# Patient Record
Sex: MALE | Race: BLACK OR AFRICAN AMERICAN | NOT HISPANIC OR LATINO | URBAN - METROPOLITAN AREA
[De-identification: names, ages, dates, MRNs, and addresses within clinical notes are randomized per-mention and may not be internally consistent; named-entity substitution may affect disease eponyms.]

---

## 2018-06-01 ENCOUNTER — OUTPATIENT (OUTPATIENT)
Dept: OUTPATIENT SERVICES | Facility: HOSPITAL | Age: 48
LOS: 1 days | End: 2018-06-01

## 2018-06-03 ENCOUNTER — INPATIENT (INPATIENT)
Facility: HOSPITAL | Age: 48
LOS: 2 days | Discharge: HOME | DRG: 305 | End: 2018-06-06
Attending: INTERNAL MEDICINE | Admitting: INTERNAL MEDICINE
Payer: MEDICAID

## 2018-06-03 VITALS
DIASTOLIC BLOOD PRESSURE: 118 MMHG | SYSTOLIC BLOOD PRESSURE: 159 MMHG | RESPIRATION RATE: 18 BRPM | TEMPERATURE: 98 F | HEART RATE: 70 BPM | OXYGEN SATURATION: 99 %

## 2018-06-03 DIAGNOSIS — R74.8 ABNORMAL LEVELS OF OTHER SERUM ENZYMES: ICD-10-CM

## 2018-06-03 LAB
ALBUMIN SERPL ELPH-MCNC: 4 G/DL — SIGNIFICANT CHANGE UP (ref 3.5–5)
ALP SERPL-CCNC: 75 U/L — SIGNIFICANT CHANGE UP (ref 40–120)
ALT FLD-CCNC: 36 U/L DA — SIGNIFICANT CHANGE UP (ref 10–60)
ANION GAP SERPL CALC-SCNC: 6 MMOL/L — SIGNIFICANT CHANGE UP (ref 5–17)
APPEARANCE UR: CLEAR — SIGNIFICANT CHANGE UP
AST SERPL-CCNC: 28 U/L — SIGNIFICANT CHANGE UP (ref 10–40)
BASOPHILS # BLD AUTO: 0.1 K/UL — SIGNIFICANT CHANGE UP (ref 0–0.2)
BASOPHILS NFR BLD AUTO: 2.5 % — HIGH (ref 0–2)
BILIRUB SERPL-MCNC: 0.8 MG/DL — SIGNIFICANT CHANGE UP (ref 0.2–1.2)
BILIRUB UR-MCNC: NEGATIVE — SIGNIFICANT CHANGE UP
BUN SERPL-MCNC: 13 MG/DL — SIGNIFICANT CHANGE UP (ref 7–18)
CALCIUM SERPL-MCNC: 8.9 MG/DL — SIGNIFICANT CHANGE UP (ref 8.4–10.5)
CHLORIDE SERPL-SCNC: 106 MMOL/L — SIGNIFICANT CHANGE UP (ref 96–108)
CK MB BLD-MCNC: 0.3 % — SIGNIFICANT CHANGE UP (ref 0–3.5)
CK MB CFR SERPL CALC: 1.4 NG/ML — SIGNIFICANT CHANGE UP (ref 0–3.6)
CK SERPL-CCNC: 547 U/L — HIGH (ref 35–232)
CO2 SERPL-SCNC: 28 MMOL/L — SIGNIFICANT CHANGE UP (ref 22–31)
COLOR SPEC: YELLOW — SIGNIFICANT CHANGE UP
CREAT SERPL-MCNC: 1.13 MG/DL — SIGNIFICANT CHANGE UP (ref 0.5–1.3)
DIFF PNL FLD: ABNORMAL
EOSINOPHIL # BLD AUTO: 0.1 K/UL — SIGNIFICANT CHANGE UP (ref 0–0.5)
EOSINOPHIL NFR BLD AUTO: 3.4 % — SIGNIFICANT CHANGE UP (ref 0–6)
GLUCOSE SERPL-MCNC: 88 MG/DL — SIGNIFICANT CHANGE UP (ref 70–99)
GLUCOSE UR QL: NEGATIVE — SIGNIFICANT CHANGE UP
HCT VFR BLD CALC: 49.5 % — SIGNIFICANT CHANGE UP (ref 39–50)
HGB BLD-MCNC: 15.8 G/DL — SIGNIFICANT CHANGE UP (ref 13–17)
KETONES UR-MCNC: NEGATIVE — SIGNIFICANT CHANGE UP
LEUKOCYTE ESTERASE UR-ACNC: NEGATIVE — SIGNIFICANT CHANGE UP
LYMPHOCYTES # BLD AUTO: 2 K/UL — SIGNIFICANT CHANGE UP (ref 1–3.3)
LYMPHOCYTES # BLD AUTO: 53.2 % — HIGH (ref 13–44)
MCHC RBC-ENTMCNC: 27.4 PG — SIGNIFICANT CHANGE UP (ref 27–34)
MCHC RBC-ENTMCNC: 32 GM/DL — SIGNIFICANT CHANGE UP (ref 32–36)
MCV RBC AUTO: 85.6 FL — SIGNIFICANT CHANGE UP (ref 80–100)
MONOCYTES # BLD AUTO: 0.2 K/UL — SIGNIFICANT CHANGE UP (ref 0–0.9)
MONOCYTES NFR BLD AUTO: 6.3 % — SIGNIFICANT CHANGE UP (ref 2–14)
NEUTROPHILS # BLD AUTO: 1.3 K/UL — LOW (ref 1.8–7.4)
NEUTROPHILS NFR BLD AUTO: 34.6 % — LOW (ref 43–77)
NITRITE UR-MCNC: NEGATIVE — SIGNIFICANT CHANGE UP
PH UR: 6 — SIGNIFICANT CHANGE UP (ref 5–8)
PLATELET # BLD AUTO: 226 K/UL — SIGNIFICANT CHANGE UP (ref 150–400)
POTASSIUM SERPL-MCNC: 4.1 MMOL/L — SIGNIFICANT CHANGE UP (ref 3.5–5.3)
POTASSIUM SERPL-SCNC: 4.1 MMOL/L — SIGNIFICANT CHANGE UP (ref 3.5–5.3)
PROT SERPL-MCNC: 7.8 G/DL — SIGNIFICANT CHANGE UP (ref 6–8.3)
PROT UR-MCNC: NEGATIVE — SIGNIFICANT CHANGE UP
RBC # BLD: 5.78 M/UL — SIGNIFICANT CHANGE UP (ref 4.2–5.8)
RBC # FLD: 13.6 % — SIGNIFICANT CHANGE UP (ref 10.3–14.5)
SODIUM SERPL-SCNC: 140 MMOL/L — SIGNIFICANT CHANGE UP (ref 135–145)
SP GR SPEC: 1.01 — SIGNIFICANT CHANGE UP (ref 1.01–1.02)
TROPONIN I SERPL-MCNC: <0.015 NG/ML — SIGNIFICANT CHANGE UP (ref 0–0.04)
UROBILINOGEN FLD QL: NEGATIVE — SIGNIFICANT CHANGE UP
WBC # BLD: 3.7 K/UL — LOW (ref 3.8–10.5)
WBC # FLD AUTO: 3.7 K/UL — LOW (ref 3.8–10.5)

## 2018-06-03 PROCEDURE — 74176 CT ABD & PELVIS W/O CONTRAST: CPT | Mod: 26

## 2018-06-03 PROCEDURE — 99285 EMERGENCY DEPT VISIT HI MDM: CPT

## 2018-06-03 RX ORDER — SODIUM CHLORIDE 9 MG/ML
1000 INJECTION INTRAMUSCULAR; INTRAVENOUS; SUBCUTANEOUS
Qty: 0 | Refills: 0 | Status: DISCONTINUED | OUTPATIENT
Start: 2018-06-03 | End: 2018-06-03

## 2018-06-03 RX ORDER — SODIUM CHLORIDE 9 MG/ML
1000 INJECTION INTRAMUSCULAR; INTRAVENOUS; SUBCUTANEOUS ONCE
Qty: 0 | Refills: 0 | Status: COMPLETED | OUTPATIENT
Start: 2018-06-03 | End: 2018-06-03

## 2018-06-03 RX ORDER — ACETAMINOPHEN 500 MG
650 TABLET ORAL ONCE
Qty: 0 | Refills: 0 | Status: COMPLETED | OUTPATIENT
Start: 2018-06-03 | End: 2018-06-03

## 2018-06-03 RX ORDER — SODIUM CHLORIDE 9 MG/ML
1000 INJECTION, SOLUTION INTRAVENOUS
Qty: 0 | Refills: 0 | Status: DISCONTINUED | OUTPATIENT
Start: 2018-06-03 | End: 2018-06-06

## 2018-06-03 NOTE — ED ADULT NURSE NOTE - OBJECTIVE STATEMENT
PAtient presents to ED c/o left flank pain for year state" hes been to his doctor they say he has a knot he" been to therapy and it doesn't help. So patient came to ER

## 2018-06-03 NOTE — ED PROVIDER NOTE - PROGRESS NOTE DETAILS
results discussed with PMD and patient, Dr. Vaughan would like patient admitted given elevated CK and hematuria and elevated BP.  Patient agrees.

## 2018-06-03 NOTE — ED PROVIDER NOTE - MEDICAL DECISION MAKING DETAILS
47 y/o M pt with likely MSK given duration of sx. However, given PMD concerns, will check CT, labs, and UA. If results unremarkable will discuss blood pressure regimen with PMD.

## 2018-06-03 NOTE — ED ADULT NURSE NOTE - CHPI ED SYMPTOMS NEG
no vomiting/no nausea/no decreased eating/drinking/no dizziness/no fever/no chills/no tingling/no weakness/no numbness

## 2018-06-03 NOTE — ED PROVIDER NOTE - OBJECTIVE STATEMENT
47 y/o M pt with a PMHx of HTN (on Exforge) and no significant PSHx presents to the ED c/o L flank pain and HTN. Pt reports that he was sent from Dr. Vaughan's office for his sx to r/u possible kidney stones/AAA; states he has had lower left back pain for a while. Pt denies leg pain, nausea, vomiting, abdominal pain or any other complaints. NKDA.

## 2018-06-03 NOTE — ED ADULT TRIAGE NOTE - CHIEF COMPLAINT QUOTE
as per pt " I have the high blood pressure back pain to the left side " sent by pmd for evaluation for high blood pressure

## 2018-06-04 DIAGNOSIS — R74.8 ABNORMAL LEVELS OF OTHER SERUM ENZYMES: ICD-10-CM

## 2018-06-04 DIAGNOSIS — I10 ESSENTIAL (PRIMARY) HYPERTENSION: ICD-10-CM

## 2018-06-04 DIAGNOSIS — R10.9 UNSPECIFIED ABDOMINAL PAIN: ICD-10-CM

## 2018-06-04 DIAGNOSIS — Z29.9 ENCOUNTER FOR PROPHYLACTIC MEASURES, UNSPECIFIED: ICD-10-CM

## 2018-06-04 LAB
ANION GAP SERPL CALC-SCNC: 8 MMOL/L — SIGNIFICANT CHANGE UP (ref 5–17)
BUN SERPL-MCNC: 10 MG/DL — SIGNIFICANT CHANGE UP (ref 7–18)
CALCIUM SERPL-MCNC: 8.9 MG/DL — SIGNIFICANT CHANGE UP (ref 8.4–10.5)
CHLORIDE SERPL-SCNC: 106 MMOL/L — SIGNIFICANT CHANGE UP (ref 96–108)
CHOLEST SERPL-MCNC: 154 MG/DL — SIGNIFICANT CHANGE UP (ref 10–199)
CK SERPL-CCNC: 520 U/L — HIGH (ref 35–232)
CO2 SERPL-SCNC: 26 MMOL/L — SIGNIFICANT CHANGE UP (ref 22–31)
CREAT SERPL-MCNC: 1.03 MG/DL — SIGNIFICANT CHANGE UP (ref 0.5–1.3)
FOLATE SERPL-MCNC: 7.3 NG/ML — SIGNIFICANT CHANGE UP
GLUCOSE SERPL-MCNC: 76 MG/DL — SIGNIFICANT CHANGE UP (ref 70–99)
HBA1C BLD-MCNC: 5.6 % — SIGNIFICANT CHANGE UP (ref 4–5.6)
HCT VFR BLD CALC: 48.7 % — SIGNIFICANT CHANGE UP (ref 39–50)
HDLC SERPL-MCNC: 39 MG/DL — LOW (ref 40–125)
HGB BLD-MCNC: 15.4 G/DL — SIGNIFICANT CHANGE UP (ref 13–17)
LIPID PNL WITH DIRECT LDL SERPL: 96 MG/DL — SIGNIFICANT CHANGE UP
MAGNESIUM SERPL-MCNC: 2.1 MG/DL — SIGNIFICANT CHANGE UP (ref 1.6–2.6)
MCHC RBC-ENTMCNC: 27.3 PG — SIGNIFICANT CHANGE UP (ref 27–34)
MCHC RBC-ENTMCNC: 31.6 GM/DL — LOW (ref 32–36)
MCV RBC AUTO: 86.2 FL — SIGNIFICANT CHANGE UP (ref 80–100)
PHOSPHATE SERPL-MCNC: 3 MG/DL — SIGNIFICANT CHANGE UP (ref 2.5–4.5)
PLATELET # BLD AUTO: 227 K/UL — SIGNIFICANT CHANGE UP (ref 150–400)
POTASSIUM SERPL-MCNC: 3.7 MMOL/L — SIGNIFICANT CHANGE UP (ref 3.5–5.3)
POTASSIUM SERPL-SCNC: 3.7 MMOL/L — SIGNIFICANT CHANGE UP (ref 3.5–5.3)
RBC # BLD: 5.65 M/UL — SIGNIFICANT CHANGE UP (ref 4.2–5.8)
RBC # FLD: 13.8 % — SIGNIFICANT CHANGE UP (ref 10.3–14.5)
SODIUM SERPL-SCNC: 140 MMOL/L — SIGNIFICANT CHANGE UP (ref 135–145)
TOTAL CHOLESTEROL/HDL RATIO MEASUREMENT: 3.9 RATIO — SIGNIFICANT CHANGE UP (ref 3.4–9.6)
TRIGL SERPL-MCNC: 95 MG/DL — SIGNIFICANT CHANGE UP (ref 10–149)
TSH SERPL-MCNC: 1.02 UU/ML — SIGNIFICANT CHANGE UP (ref 0.34–4.82)
VIT B12 SERPL-MCNC: 513 PG/ML — SIGNIFICANT CHANGE UP (ref 232–1245)
WBC # BLD: 4.5 K/UL — SIGNIFICANT CHANGE UP (ref 3.8–10.5)
WBC # FLD AUTO: 4.5 K/UL — SIGNIFICANT CHANGE UP (ref 3.8–10.5)

## 2018-06-04 RX ORDER — NIFEDIPINE 30 MG
30 TABLET, EXTENDED RELEASE 24 HR ORAL ONCE
Qty: 0 | Refills: 0 | Status: COMPLETED | OUTPATIENT
Start: 2018-06-04 | End: 2018-06-04

## 2018-06-04 RX ORDER — LOSARTAN POTASSIUM 100 MG/1
100 TABLET, FILM COATED ORAL DAILY
Qty: 0 | Refills: 0 | Status: DISCONTINUED | OUTPATIENT
Start: 2018-06-04 | End: 2018-06-06

## 2018-06-04 RX ADMIN — LOSARTAN POTASSIUM 100 MILLIGRAM(S): 100 TABLET, FILM COATED ORAL at 22:02

## 2018-06-04 RX ADMIN — SODIUM CHLORIDE 1000 MILLILITER(S): 9 INJECTION INTRAMUSCULAR; INTRAVENOUS; SUBCUTANEOUS at 02:03

## 2018-06-04 RX ADMIN — Medication 30 MILLIGRAM(S): at 06:52

## 2018-06-04 NOTE — H&P ADULT - HISTORY OF PRESENT ILLNESS
49 y/o M  with a PMHx of HTN  presents to the ED c/o L flank pain and HTN. Pt reports that he was sent from Dr. Vaughan's office for this symptoms and was sent to ED to r/u possible kidney stones vs AAA. Pt states he has had lower left back pain for a while and is not new. At ED abdominal CT was done which came back negative for kidney stones. Chemistry shows elevated CK. Pt does not remember his BP medication   Pt denies leg pain, nausea, vomiting, abdominal pain or any other complaints. NKDA

## 2018-06-04 NOTE — H&P ADULT - ASSESSMENT
49 y/o M  with a PMHx of HTN  presents to the ED c/o L flank pain and HTN. Pt reports that he was sent from Dr. Vaughan's office for this symptoms and was sent to ED to r/u possible kidney stones vs AAA. Pt states he has had lower left back pain for a while and is not new. At ED abdominal CT was done which came back negative for kidney stones. Chemistry shows elevated CK.

## 2018-06-05 LAB
ANION GAP SERPL CALC-SCNC: 7 MMOL/L — SIGNIFICANT CHANGE UP (ref 5–17)
BUN SERPL-MCNC: 8 MG/DL — SIGNIFICANT CHANGE UP (ref 7–18)
CALCIUM SERPL-MCNC: 8.6 MG/DL — SIGNIFICANT CHANGE UP (ref 8.4–10.5)
CHLORIDE SERPL-SCNC: 106 MMOL/L — SIGNIFICANT CHANGE UP (ref 96–108)
CO2 SERPL-SCNC: 29 MMOL/L — SIGNIFICANT CHANGE UP (ref 22–31)
CREAT SERPL-MCNC: 1.01 MG/DL — SIGNIFICANT CHANGE UP (ref 0.5–1.3)
GLUCOSE SERPL-MCNC: 86 MG/DL — SIGNIFICANT CHANGE UP (ref 70–99)
HCT VFR BLD CALC: 49.1 % — SIGNIFICANT CHANGE UP (ref 39–50)
HGB BLD-MCNC: 15.4 G/DL — SIGNIFICANT CHANGE UP (ref 13–17)
MCHC RBC-ENTMCNC: 27.2 PG — SIGNIFICANT CHANGE UP (ref 27–34)
MCHC RBC-ENTMCNC: 31.4 GM/DL — LOW (ref 32–36)
MCV RBC AUTO: 86.5 FL — SIGNIFICANT CHANGE UP (ref 80–100)
PLATELET # BLD AUTO: 225 K/UL — SIGNIFICANT CHANGE UP (ref 150–400)
POTASSIUM SERPL-MCNC: 3.4 MMOL/L — LOW (ref 3.5–5.3)
POTASSIUM SERPL-SCNC: 3.4 MMOL/L — LOW (ref 3.5–5.3)
RBC # BLD: 5.68 M/UL — SIGNIFICANT CHANGE UP (ref 4.2–5.8)
RBC # FLD: 13.5 % — SIGNIFICANT CHANGE UP (ref 10.3–14.5)
SODIUM SERPL-SCNC: 142 MMOL/L — SIGNIFICANT CHANGE UP (ref 135–145)
WBC # BLD: 4.7 K/UL — SIGNIFICANT CHANGE UP (ref 3.8–10.5)
WBC # FLD AUTO: 4.7 K/UL — SIGNIFICANT CHANGE UP (ref 3.8–10.5)

## 2018-06-05 PROCEDURE — 99222 1ST HOSP IP/OBS MODERATE 55: CPT

## 2018-06-05 RX ORDER — NIFEDIPINE 30 MG
60 TABLET, EXTENDED RELEASE 24 HR ORAL DAILY
Qty: 0 | Refills: 0 | Status: DISCONTINUED | OUTPATIENT
Start: 2018-06-05 | End: 2018-06-06

## 2018-06-05 RX ORDER — TRAMADOL HYDROCHLORIDE 50 MG/1
25 TABLET ORAL ONCE
Qty: 0 | Refills: 0 | Status: DISCONTINUED | OUTPATIENT
Start: 2018-06-05 | End: 2018-06-05

## 2018-06-05 RX ADMIN — Medication 60 MILLIGRAM(S): at 16:14

## 2018-06-05 RX ADMIN — TRAMADOL HYDROCHLORIDE 25 MILLIGRAM(S): 50 TABLET ORAL at 21:29

## 2018-06-05 RX ADMIN — SODIUM CHLORIDE 100 MILLILITER(S): 9 INJECTION, SOLUTION INTRAVENOUS at 06:49

## 2018-06-05 RX ADMIN — TRAMADOL HYDROCHLORIDE 25 MILLIGRAM(S): 50 TABLET ORAL at 22:00

## 2018-06-05 NOTE — CONSULT NOTE ADULT - SUBJECTIVE AND OBJECTIVE BOX
General Surgery Consultation Note    Patient is a 48y old  Male who presents with a chief complaint of left flank pain (2018 04:00)      HPI:  47 y/o M  with a PMHx of HTN  presents to the ED c/o L flank pain and HTN.  Pt had CT scan which showed gallstones. Per patient, he only had L flank pain on this admission. Pt states had an episode of epigastric pain once in the past.  Denies any history of yellowing of skin or eyes.  Pt admits to nl urine and stool color.  Pt c/o upper right back mass for 2 years which has grown in size since first noted.  He would like it removed. Pt denies pain, drainage from mass. No other complaints.     PAST MEDICAL & SURGICAL HISTORY:  Hypertension  No significant past surgical history      Allergies    No Known Allergies    MEDICATIONS  (STANDING):  losartan 100 milliGRAM(s) Oral daily  NIFEdipine XL 60 milliGRAM(s) Oral daily  sodium chloride 0.45%. 1000 milliLiter(s) (100 mL/Hr) IV Continuous <Continuous>    MEDICATIONS  (PRN):      Vital Signs Last 24 Hrs  T(C): 36.9 (2018 14:30), Max: 37.2 (2018 20:45)  T(F): 98.5 (2018 14:30), Max: 98.9 (2018 20:45)  HR: 70 (2018 14:30) (68 - 73)  BP: 159/104 (2018 14:30) (137/92 - 159/104)  BP(mean): --  RR: 16 (2018 14:30) (16 - 18)  SpO2: 100% (2018 14:30) (100% - 100%)    Physical Exam:  Gen: awake, alert oriented NAD  HEENT: anicteric  Abd: soft NT ND  Back: L mid back tenderness with palpation. no masses palpated. spine midline. R upper back pedunculated flesh colored mass, mobile, soft, nontender  Ext: warm to touch no c/c/e    Labs:                          15.4   4.7   )-----------( 225      ( 2018 08:55 )             49.1     06-05    142  |  106  |  8   ----------------------------<  86  3.4<L>   |  29  |  1.01    Ca    8.6      2018 08:55  Phos  3.0     06-04  Mg     2.1     06-04    TPro  7.8  /  Alb  4.0  /  TBili  0.8  /  DBili  x   /  AST  28  /  ALT  36  /  AlkPhos  75  06-03      Urinalysis Basic - ( 2018 18:37 )    Color: Yellow / Appearance: Clear / S.015 / pH: x  Gluc: x / Ketone: Negative  / Bili: Negative / Urobili: Negative   Blood: x / Protein: Negative / Nitrite: Negative   Leuk Esterase: Negative / RBC: 2-5 /HPF / WBC 0-2 /HPF   Sq Epi: x / Non Sq Epi: Occasional /HPF / Bacteria: Trace /HPF        Radiological Exams:  < from: CT Abdomen and Pelvis No Cont (18 @ 21:51) >  IMPRESSION:    No nephrolithiasis, hydronephrosis or obstructive uropathy.  Partially contracted gallbladder containing multiple gallstones.  Small umbilical hernia containing fat and portion of nonobstructed small   bowel loops.  Normal appendix.    < end of copied text >

## 2018-06-05 NOTE — PROGRESS NOTE ADULT - PROBLEM SELECTOR PLAN 1
CK: 547  no signs of trauma   hydration   f/u CK am
CK: 547-> 520  no signs of trauma   IV hydration   f/u CK am

## 2018-06-05 NOTE — PROGRESS NOTE ADULT - ASSESSMENT
49 y/o M  with a PMHx of HTN  presents to the ED c/o L flank pain and HTN. Pt reports that he was sent from Dr. Vaughan's office for this symptoms and was sent to ED to r/u possible kidney stones vs AAA. Pt states he has had lower left back pain for a while and is not new. At ED abdominal CT was done which came back negative for kidney stones. Chemistry shows elevated CK.
47 y/o M  with a PMHx of HTN  presents to the ED c/o L flank pain and HTN. Pt reports that he was sent from Dr. Vaughan's office for this symptoms and was sent to ED to r/u possible kidney stones vs AAA. Pt states he has had lower left back pain for a while and is not new. At ED abdominal CT was done which came back negative for kidney stones. Chemistry shows elevated CK.

## 2018-06-05 NOTE — PROGRESS NOTE ADULT - PROBLEM SELECTOR PLAN 2
left flank pain  UA: mild hematuria  passing stone?  IVF  pain control as needed  gi and surgical eval for gallstone
left flank pain  UA: mild hematuria  passing stone?  IVF  pain control as needed  gi and surgical eval for gallstone

## 2018-06-05 NOTE — PROGRESS NOTE ADULT - SUBJECTIVE AND OBJECTIVE BOX
Patient is a 48y old  Male who presents with a chief complaint of left flank pain (2018 04:00)      INTERVAL HPI/OVERNIGHT EVENTS:    T(C): 36.7 (18 @ 05:37), Max: 37.2 (18 @ 20:45)  HR: 71 (18 @ 05:37) (68 - 83)  BP: 146/105 (18 @ 05:37) (137/92 - 151/107)  RR: 18 (18 @ 05:37) (17 - 18)  SpO2: 100% (18 @ 05:37) (100% - 100%)  Wt(kg): --  I&O's Summary      LABS:                        15.4   4.7   )-----------( 225      ( 2018 08:55 )             49.1     06-04    140  |  106  |  10  ----------------------------<  76  3.7   |  26  |  1.03    Ca    8.9      2018 06:46  Phos  3.0     06-04  Mg     2.1     06-04    TPro  7.8  /  Alb  4.0  /  TBili  0.8  /  DBili  x   /  AST  28  /  ALT  36  /  AlkPhos  75  06-03      Urinalysis Basic - ( 2018 18:37 )    Color: Yellow / Appearance: Clear / S.015 / pH: x  Gluc: x / Ketone: Negative  / Bili: Negative / Urobili: Negative   Blood: x / Protein: Negative / Nitrite: Negative   Leuk Esterase: Negative / RBC: 2-5 /HPF / WBC 0-2 /HPF   Sq Epi: x / Non Sq Epi: Occasional /HPF / Bacteria: Trace /HPF      CAPILLARY BLOOD GLUCOSE        LIPID PANEL  Cholesterol 154  LDL 96  HDL 39  RATIO HDL/Total Cholesterol --  Triglyceride 95        Urinalysis Basic - ( 2018 18:37 )    Color: Yellow / Appearance: Clear / S.015 / pH: x  Gluc: x / Ketone: Negative  / Bili: Negative / Urobili: Negative   Blood: x / Protein: Negative / Nitrite: Negative   Leuk Esterase: Negative / RBC: 2-5 /HPF / WBC 0-2 /HPF   Sq Epi: x / Non Sq Epi: Occasional /HPF / Bacteria: Trace /HPF        MEDICATIONS  (STANDING):  losartan 100 milliGRAM(s) Oral daily  sodium chloride 0.45%. 1000 milliLiter(s) (100 mL/Hr) IV Continuous <Continuous>    MEDICATIONS  (PRN):        PHYSICAL EXAM:  GENERAL: NAD  	LUNG: Clear to auscultation bilaterally; No wheeze; No crackles; No accessory muscles used  	CVS: Regular rate and rhythm, no RMG  	ABDOMEN: Soft, Nontender, Nondistended; Bowel sounds present; No guarding  	: no suprapubic pain   	EXTREMITIES:  2+ Peripheral Pulses, No cyanosis or edema  	SKIN: No rashes or lesions  Neuro: AAOx3, non-focal    Care Discussed with Consultants/Other Providers [ x] YES  [ ] NO        Imaging Personally Reviewed:  [x ] YES  [ ] NO    RADIOLOGY & ADDITIONAL TESTS:  < from: CT Abdomen and Pelvis No Cont (18 @ 21:51) >  IMPRESSION:    No nephrolithiasis, hydronephrosis or obstructive uropathy.  Partially contracted gallbladder containing multiple gallstones.  Small umbilical hernia containing fat and portion of nonobstructed small   bowel loops.  Normal appendix.    < end of copied text >
Patient was seen and examined  Patient is a 48y old  Male who presents with a chief complaint of left flank pain (2018 04:00)      INTERVAL HPI/OVERNIGHT EVENTS:  T(C): 36.7 (18 @ 05:37), Max: 37.2 (18 @ 20:45)  HR: 71 (18 @ 05:37) (68 - 83)  BP: 146/105 (18 @ 05:37) (137/92 - 151/107)  RR: 18 (18 @ 05:37) (17 - 18)  SpO2: 100% (18 @ 05:37) (100% - 100%)  Wt(kg): --  I&O's Summary      LABS:                        15.4   4.7   )-----------( 225      ( 2018 08:55 )             49.1     06-04    140  |  106  |  10  ----------------------------<  76  3.7   |  26  |  1.03    Ca    8.9      2018 06:46  Phos  3.0     06-04  Mg     2.1     06-04    TPro  7.8  /  Alb  4.0  /  TBili  0.8  /  DBili  x   /  AST  28  /  ALT  36  /  AlkPhos  75  06-03      Urinalysis Basic - ( 2018 18:37 )    Color: Yellow / Appearance: Clear / S.015 / pH: x  Gluc: x / Ketone: Negative  / Bili: Negative / Urobili: Negative   Blood: x / Protein: Negative / Nitrite: Negative   Leuk Esterase: Negative / RBC: 2-5 /HPF / WBC 0-2 /HPF   Sq Epi: x / Non Sq Epi: Occasional /HPF / Bacteria: Trace /HPF      CAPILLARY BLOOD GLUCOSE        LIPID PANEL  Cholesterol 154  LDL 96  HDL 39  RATIO HDL/Total Cholesterol --  Triglyceride 95        Urinalysis Basic - ( 2018 18:37 )    Color: Yellow / Appearance: Clear / S.015 / pH: x  Gluc: x / Ketone: Negative  / Bili: Negative / Urobili: Negative   Blood: x / Protein: Negative / Nitrite: Negative   Leuk Esterase: Negative / RBC: 2-5 /HPF / WBC 0-2 /HPF   Sq Epi: x / Non Sq Epi: Occasional /HPF / Bacteria: Trace /HPF        MEDICATIONS  (STANDING):  losartan 100 milliGRAM(s) Oral daily  sodium chloride 0.45%. 1000 milliLiter(s) (100 mL/Hr) IV Continuous <Continuous>    MEDICATIONS  (PRN):      RADIOLOGY & ADDITIONAL TESTS:    Imaging Personally Reviewed:  [ ] YES  [ ] NO    REVIEW OF SYSTEMS:  CONSTITUTIONAL: No fever, weight loss, or fatigue  EYES: No eye pain, visual disturbances, or discharge  ENMT:  No difficulty hearing, tinnitus, vertigo; No sinus or throat pain  NECK: No pain or stiffness  BREASTS: No pain, masses, or nipple discharge  RESPIRATORY: No cough, wheezing, chills or hemoptysis; No shortness of breath  CARDIOVASCULAR: No chest pain, palpitations, dizziness, or leg swelling  GASTROINTESTINAL: No abdominal or epigastric pain. No nausea, vomiting, or hematemesis; No diarrhea or constipation. No melena or hematochezia.  GENITOURINARY: No dysuria, frequency, hematuria, or incontinence  NEUROLOGICAL: No headaches, memory loss, loss of strength, numbness, or tremors  SKIN: No itching, burning, rashes, or lesions   LYMPH NODES: No enlarged glands  ENDOCRINE: No heat or cold intolerance; No hair loss  MUSCULOSKELETAL: No joint pain or swelling; No muscle, back, or extremity pain  PSYCHIATRIC: No depression, anxiety, mood swings, or difficulty sleeping  HEME/LYMPH: No easy bruising, or bleeding gums  ALLERY AND IMMUNOLOGIC: No hives or eczema      Consultant(s) Notes Reviewed:  [ ] YES  [ ] NO    PHYSICAL EXAM:  GENERAL: NAD, well-groomed, well-developed  HEAD:  Atraumatic, Normocephalic  EYES: EOMI, PERRLA, conjunctiva and sclera clear  ENMT: No tonsillar erythema, exudates, or enlargement; Moist mucous membranes, Good dentition, No lesions  NECK: Supple, No JVD, Normal thyroid  NERVOUS SYSTEM:  Alert & Oriented X3, Good concentration; Motor Strength 5/5 B/L upper and lower extremities; DTRs 2+ intact and symmetric  CHEST/LUNG: Clear to percussion bilaterally; No rales, rhonchi, wheezing, or rubs  HEART: Regular rate and rhythm; No murmurs, rubs, or gallops  ABDOMEN: Soft, Nontender, Nondistended; Bowel sounds present  EXTREMITIES:  2+ Peripheral Pulses, No clubbing, cyanosis, or edema  LYMPH: No lymphadenopathy noted  SKIN: No rashes or lesions    Care Discussed with Consultants/Other Providers [ x] YES  [ ] NO

## 2018-06-05 NOTE — PROGRESS NOTE ADULT - PROBLEM SELECTOR PLAN 4
IMPROVE VTE Individual Risk Assessment    RISK                                                          Points  [] Previous VTE                                           3  [] Thrombophilia                                        2  [] Lower limb paralysis                              2   [] Current Cancer                                       2   [] Immobilization > 24 hrs                        1  [] ICU/CCU stay > 24 hours                       1  [] Age > 60                                                   1    IMPROVE VTE Score: 0
IMPROVE VTE Individual Risk Assessment    RISK                                                          Points  [] Previous VTE                                           3  [] Thrombophilia                                        2  [] Lower limb paralysis                              2   [] Current Cancer                                       2   [] Immobilization > 24 hrs                        1  [] ICU/CCU stay > 24 hours                       1  [] Age > 60                                                   1    IMPROVE VTE Score: 0 no dvt ppx needed

## 2018-06-05 NOTE — CONSULT NOTE ADULT - ASSESSMENT
Asymptomatic Cholelithiasis  Right upper back Skin tag  1. no acute surgical intervention at this time  2. f/u with Dr. Lacey in the surgical office as an outpatient   3. D/w Dr. Lacey and agreed

## 2018-06-05 NOTE — CONSULT NOTE ADULT - ATTENDING COMMENTS
pt with asymptomatic gallstones and also a pedunculated lesion of the back.  Had a long d/w the pt about the options. For asymptomatic gallstones, recommend conservative rx and for the lesion in the back, to come in the office for excision

## 2018-06-05 NOTE — PROGRESS NOTE ADULT - PROBLEM SELECTOR PLAN 3
BP: 150/100  inc procardia to 60 mg cont with losartan   pt does not remember his BP med
BP: 150/100  inc procardia to 60 mg cont with losartan   pt does not remember his BP med

## 2018-06-06 ENCOUNTER — TRANSCRIPTION ENCOUNTER (OUTPATIENT)
Age: 48
End: 2018-06-06

## 2018-06-06 VITALS
OXYGEN SATURATION: 98 % | HEART RATE: 81 BPM | DIASTOLIC BLOOD PRESSURE: 85 MMHG | RESPIRATION RATE: 17 BRPM | SYSTOLIC BLOOD PRESSURE: 131 MMHG | TEMPERATURE: 98 F

## 2018-06-06 DIAGNOSIS — R69 ILLNESS, UNSPECIFIED: ICD-10-CM

## 2018-06-06 LAB
ANION GAP SERPL CALC-SCNC: 3 MMOL/L — LOW (ref 5–17)
ANION GAP SERPL CALC-SCNC: 8 MMOL/L — SIGNIFICANT CHANGE UP (ref 5–17)
BUN SERPL-MCNC: 10 MG/DL — SIGNIFICANT CHANGE UP (ref 7–18)
BUN SERPL-MCNC: 11 MG/DL — SIGNIFICANT CHANGE UP (ref 7–18)
CALCIUM SERPL-MCNC: 8.7 MG/DL — SIGNIFICANT CHANGE UP (ref 8.4–10.5)
CALCIUM SERPL-MCNC: 8.7 MG/DL — SIGNIFICANT CHANGE UP (ref 8.4–10.5)
CHLORIDE SERPL-SCNC: 102 MMOL/L — SIGNIFICANT CHANGE UP (ref 96–108)
CHLORIDE SERPL-SCNC: 106 MMOL/L — SIGNIFICANT CHANGE UP (ref 96–108)
CK SERPL-CCNC: 432 U/L — HIGH (ref 35–232)
CO2 SERPL-SCNC: 28 MMOL/L — SIGNIFICANT CHANGE UP (ref 22–31)
CO2 SERPL-SCNC: 30 MMOL/L — SIGNIFICANT CHANGE UP (ref 22–31)
CREAT SERPL-MCNC: 0.99 MG/DL — SIGNIFICANT CHANGE UP (ref 0.5–1.3)
CREAT SERPL-MCNC: 1.13 MG/DL — SIGNIFICANT CHANGE UP (ref 0.5–1.3)
GLUCOSE SERPL-MCNC: 144 MG/DL — HIGH (ref 70–99)
GLUCOSE SERPL-MCNC: 76 MG/DL — SIGNIFICANT CHANGE UP (ref 70–99)
HCT VFR BLD CALC: 52.2 % — HIGH (ref 39–50)
HGB BLD-MCNC: 16.3 G/DL — SIGNIFICANT CHANGE UP (ref 13–17)
MAGNESIUM SERPL-MCNC: 2 MG/DL — SIGNIFICANT CHANGE UP (ref 1.6–2.6)
MCHC RBC-ENTMCNC: 26.8 PG — LOW (ref 27–34)
MCHC RBC-ENTMCNC: 31.3 GM/DL — LOW (ref 32–36)
MCV RBC AUTO: 85.7 FL — SIGNIFICANT CHANGE UP (ref 80–100)
PHOSPHATE SERPL-MCNC: 2.7 MG/DL — SIGNIFICANT CHANGE UP (ref 2.5–4.5)
PLATELET # BLD AUTO: 226 K/UL — SIGNIFICANT CHANGE UP (ref 150–400)
POTASSIUM SERPL-MCNC: 2.9 MMOL/L — CRITICAL LOW (ref 3.5–5.3)
POTASSIUM SERPL-MCNC: 4.4 MMOL/L — SIGNIFICANT CHANGE UP (ref 3.5–5.3)
POTASSIUM SERPL-SCNC: 2.9 MMOL/L — CRITICAL LOW (ref 3.5–5.3)
POTASSIUM SERPL-SCNC: 4.4 MMOL/L — SIGNIFICANT CHANGE UP (ref 3.5–5.3)
RBC # BLD: 6.09 M/UL — HIGH (ref 4.2–5.8)
RBC # FLD: 13.5 % — SIGNIFICANT CHANGE UP (ref 10.3–14.5)
SODIUM SERPL-SCNC: 138 MMOL/L — SIGNIFICANT CHANGE UP (ref 135–145)
SODIUM SERPL-SCNC: 139 MMOL/L — SIGNIFICANT CHANGE UP (ref 135–145)
WBC # BLD: 4.8 K/UL — SIGNIFICANT CHANGE UP (ref 3.8–10.5)
WBC # FLD AUTO: 4.8 K/UL — SIGNIFICANT CHANGE UP (ref 3.8–10.5)

## 2018-06-06 PROCEDURE — 83735 ASSAY OF MAGNESIUM: CPT

## 2018-06-06 PROCEDURE — 80048 BASIC METABOLIC PNL TOTAL CA: CPT

## 2018-06-06 PROCEDURE — 84443 ASSAY THYROID STIM HORMONE: CPT

## 2018-06-06 PROCEDURE — 93005 ELECTROCARDIOGRAM TRACING: CPT

## 2018-06-06 PROCEDURE — 80061 LIPID PANEL: CPT

## 2018-06-06 PROCEDURE — 82746 ASSAY OF FOLIC ACID SERUM: CPT

## 2018-06-06 PROCEDURE — 84100 ASSAY OF PHOSPHORUS: CPT

## 2018-06-06 PROCEDURE — 82550 ASSAY OF CK (CPK): CPT

## 2018-06-06 PROCEDURE — 99285 EMERGENCY DEPT VISIT HI MDM: CPT | Mod: 25

## 2018-06-06 PROCEDURE — 84484 ASSAY OF TROPONIN QUANT: CPT

## 2018-06-06 PROCEDURE — 80053 COMPREHEN METABOLIC PANEL: CPT

## 2018-06-06 PROCEDURE — 82553 CREATINE MB FRACTION: CPT

## 2018-06-06 PROCEDURE — 81001 URINALYSIS AUTO W/SCOPE: CPT

## 2018-06-06 PROCEDURE — 82607 VITAMIN B-12: CPT

## 2018-06-06 PROCEDURE — 74176 CT ABD & PELVIS W/O CONTRAST: CPT

## 2018-06-06 PROCEDURE — 85027 COMPLETE CBC AUTOMATED: CPT

## 2018-06-06 PROCEDURE — 83036 HEMOGLOBIN GLYCOSYLATED A1C: CPT

## 2018-06-06 RX ORDER — LOSARTAN POTASSIUM 100 MG/1
1 TABLET, FILM COATED ORAL
Qty: 30 | Refills: 0 | OUTPATIENT
Start: 2018-06-06

## 2018-06-06 RX ORDER — POTASSIUM CHLORIDE 20 MEQ
10 PACKET (EA) ORAL
Qty: 0 | Refills: 0 | Status: COMPLETED | OUTPATIENT
Start: 2018-06-06 | End: 2018-06-06

## 2018-06-06 RX ORDER — NIFEDIPINE 30 MG
1 TABLET, EXTENDED RELEASE 24 HR ORAL
Qty: 0 | Refills: 0 | COMMUNITY
Start: 2018-06-06

## 2018-06-06 RX ORDER — LOSARTAN POTASSIUM 100 MG/1
1 TABLET, FILM COATED ORAL
Qty: 0 | Refills: 0 | COMMUNITY

## 2018-06-06 RX ORDER — POTASSIUM CHLORIDE 20 MEQ
40 PACKET (EA) ORAL EVERY 4 HOURS
Qty: 0 | Refills: 0 | Status: COMPLETED | OUTPATIENT
Start: 2018-06-06 | End: 2018-06-06

## 2018-06-06 RX ORDER — NIFEDIPINE 30 MG
1 TABLET, EXTENDED RELEASE 24 HR ORAL
Qty: 30 | Refills: 0 | OUTPATIENT
Start: 2018-06-06 | End: 2018-07-05

## 2018-06-06 RX ADMIN — Medication 40 MILLIEQUIVALENT(S): at 11:22

## 2018-06-06 RX ADMIN — Medication 100 MILLIEQUIVALENT(S): at 12:40

## 2018-06-06 RX ADMIN — SODIUM CHLORIDE 100 MILLILITER(S): 9 INJECTION, SOLUTION INTRAVENOUS at 05:49

## 2018-06-06 RX ADMIN — Medication 100 MILLIEQUIVALENT(S): at 11:22

## 2018-06-06 RX ADMIN — LOSARTAN POTASSIUM 100 MILLIGRAM(S): 100 TABLET, FILM COATED ORAL at 05:49

## 2018-06-06 RX ADMIN — Medication 60 MILLIGRAM(S): at 05:49

## 2018-06-06 RX ADMIN — Medication 100 MILLIEQUIVALENT(S): at 13:59

## 2018-06-06 RX ADMIN — Medication 40 MILLIEQUIVALENT(S): at 13:59

## 2018-06-06 NOTE — DISCHARGE NOTE ADULT - MEDICATION SUMMARY - MEDICATIONS TO TAKE
I will START or STAY ON the medications listed below when I get home from the hospital:    losartan 100 mg oral tablet  -- 1 tab(s) by mouth once a day  -- Indication: For Hypertension    NIFEdipine 60 mg oral tablet, extended release  -- 1 tab(s) by mouth once a day  -- Indication: For Hypertension

## 2018-06-06 NOTE — DISCHARGE NOTE ADULT - PLAN OF CARE
to treat it continue current meds, oral hydration encouraged, f/u PMD and Surgery Dr. Lacey within 1 week

## 2018-06-06 NOTE — DISCHARGE NOTE ADULT - CARE PLAN
Principal Discharge DX:	Elevated CK  Goal:	to treat it  Assessment and plan of treatment:	continue current meds, oral hydration encouraged, f/u PMD and Surgery Dr. Lacey within 1 week  Secondary Diagnosis:	Flank pain  Assessment and plan of treatment:	continue current meds, oral hydration encouraged, f/u PMD and Surgery Dr. Lacey within 1 week  Secondary Diagnosis:	Essential hypertension

## 2018-06-06 NOTE — DISCHARGE NOTE ADULT - HOSPITAL COURSE
47 y/o M  with a PMHx of HTN  presents to the ED c/o L flank pain and HTN. Pt reports that he was sent from Dr. Vaughan's office for this symptoms and was sent to ED to r/u possible kidney stones vs AAA. Pt states he has had lower left back pain for a while and is not new. At ED abdominal CT was done which came back negative for kidney stones. Chemistry shows elevated CK.     1. Elevated CK: CK: 547-> 520  no signs of trauma   s/p IV hydration       2. Flank pain: left flank pain  UA: mild hematuria  passed stone likely  s/p IVF  CT Abd sis not show renal stone, but multiple gall stones noted  surgery Dr. Lacey consulted for asymptomatic gallstones rec outpt follow up.     3. Hypertension:   c/w home BP meds    Pt is stable to be discharged as per Attending

## 2018-06-06 NOTE — DISCHARGE NOTE ADULT - PATIENT PORTAL LINK FT
You can access the [facility] Portal, offered by [facility], by registering with the following website: [email or web address]

## 2018-07-01 ENCOUNTER — OUTPATIENT (OUTPATIENT)
Dept: OUTPATIENT SERVICES | Facility: HOSPITAL | Age: 48
LOS: 1 days | End: 2018-07-01

## 2018-07-17 DIAGNOSIS — Z71.89 OTHER SPECIFIED COUNSELING: ICD-10-CM

## 2018-09-04 PROBLEM — Z00.00 ENCOUNTER FOR PREVENTIVE HEALTH EXAMINATION: Status: ACTIVE | Noted: 2018-09-04

## 2018-09-17 ENCOUNTER — APPOINTMENT (OUTPATIENT)
Dept: SURGERY | Facility: CLINIC | Age: 48
End: 2018-09-17

## 2019-04-10 NOTE — PATIENT PROFILE ADULT. - NS PRO CONTRA FLU 1
Patient states she has had a sore throat and fatigue for 9 days. Patient unsure if she has a fever because she doesn't have a thermometer. Patient states she was exposed to strep throat because her grandchildren have it. Patient requesting if MD would call an antibiotic in for her as she has bad eyes and it's difficult for her to find a ride. Please return call. out of season (available sept 1 thru apr 2 only)

## 2020-06-09 ENCOUNTER — TRANSCRIPTION ENCOUNTER (OUTPATIENT)
Age: 50
End: 2020-06-09

## 2020-06-09 ENCOUNTER — EMERGENCY (EMERGENCY)
Facility: HOSPITAL | Age: 50
LOS: 1 days | Discharge: HOME | End: 2020-06-09
Attending: EMERGENCY MEDICINE
Payer: MEDICAID

## 2020-06-09 VITALS
HEART RATE: 93 BPM | SYSTOLIC BLOOD PRESSURE: 186 MMHG | DIASTOLIC BLOOD PRESSURE: 128 MMHG | TEMPERATURE: 98 F | HEIGHT: 66 IN | RESPIRATION RATE: 19 BRPM | WEIGHT: 210.1 LBS | OXYGEN SATURATION: 96 %

## 2020-06-09 VITALS
OXYGEN SATURATION: 98 % | RESPIRATION RATE: 18 BRPM | SYSTOLIC BLOOD PRESSURE: 153 MMHG | DIASTOLIC BLOOD PRESSURE: 96 MMHG | TEMPERATURE: 98 F | HEART RATE: 73 BPM

## 2020-06-09 LAB
ALBUMIN SERPL ELPH-MCNC: 3.9 G/DL — SIGNIFICANT CHANGE UP (ref 3.5–5)
ALP SERPL-CCNC: 70 U/L — SIGNIFICANT CHANGE UP (ref 40–120)
ALT FLD-CCNC: 25 U/L DA — SIGNIFICANT CHANGE UP (ref 10–60)
ANION GAP SERPL CALC-SCNC: 7 MMOL/L — SIGNIFICANT CHANGE UP (ref 5–17)
APPEARANCE UR: CLEAR — SIGNIFICANT CHANGE UP
AST SERPL-CCNC: 24 U/L — SIGNIFICANT CHANGE UP (ref 10–40)
BACTERIA # UR AUTO: ABNORMAL /HPF
BASOPHILS # BLD AUTO: 0.03 K/UL — SIGNIFICANT CHANGE UP (ref 0–0.2)
BASOPHILS NFR BLD AUTO: 0.7 % — SIGNIFICANT CHANGE UP (ref 0–2)
BILIRUB SERPL-MCNC: 0.9 MG/DL — SIGNIFICANT CHANGE UP (ref 0.2–1.2)
BILIRUB UR-MCNC: NEGATIVE — SIGNIFICANT CHANGE UP
BUN SERPL-MCNC: 11 MG/DL — SIGNIFICANT CHANGE UP (ref 7–18)
CALCIUM SERPL-MCNC: 8.6 MG/DL — SIGNIFICANT CHANGE UP (ref 8.4–10.5)
CHLORIDE SERPL-SCNC: 108 MMOL/L — SIGNIFICANT CHANGE UP (ref 96–108)
CO2 SERPL-SCNC: 26 MMOL/L — SIGNIFICANT CHANGE UP (ref 22–31)
COLOR SPEC: YELLOW — SIGNIFICANT CHANGE UP
CREAT SERPL-MCNC: 1.13 MG/DL — SIGNIFICANT CHANGE UP (ref 0.5–1.3)
DIFF PNL FLD: ABNORMAL
EOSINOPHIL # BLD AUTO: 0.07 K/UL — SIGNIFICANT CHANGE UP (ref 0–0.5)
EOSINOPHIL NFR BLD AUTO: 1.6 % — SIGNIFICANT CHANGE UP (ref 0–6)
EPI CELLS # UR: SIGNIFICANT CHANGE UP /HPF
GLUCOSE SERPL-MCNC: 84 MG/DL — SIGNIFICANT CHANGE UP (ref 70–99)
GLUCOSE UR QL: NEGATIVE — SIGNIFICANT CHANGE UP
HCT VFR BLD CALC: 45.2 % — SIGNIFICANT CHANGE UP (ref 39–50)
HGB BLD-MCNC: 14.9 G/DL — SIGNIFICANT CHANGE UP (ref 13–17)
HYALINE CASTS # UR AUTO: ABNORMAL /LPF
IMM GRANULOCYTES NFR BLD AUTO: 0 % — SIGNIFICANT CHANGE UP (ref 0–1.5)
KETONES UR-MCNC: NEGATIVE — SIGNIFICANT CHANGE UP
LEUKOCYTE ESTERASE UR-ACNC: NEGATIVE — SIGNIFICANT CHANGE UP
LIDOCAIN IGE QN: 116 U/L — SIGNIFICANT CHANGE UP (ref 73–393)
LYMPHOCYTES # BLD AUTO: 1.99 K/UL — SIGNIFICANT CHANGE UP (ref 1–3.3)
LYMPHOCYTES # BLD AUTO: 46.7 % — HIGH (ref 13–44)
MCHC RBC-ENTMCNC: 26.8 PG — LOW (ref 27–34)
MCHC RBC-ENTMCNC: 33 GM/DL — SIGNIFICANT CHANGE UP (ref 32–36)
MCV RBC AUTO: 81.4 FL — SIGNIFICANT CHANGE UP (ref 80–100)
MONOCYTES # BLD AUTO: 0.46 K/UL — SIGNIFICANT CHANGE UP (ref 0–0.9)
MONOCYTES NFR BLD AUTO: 10.8 % — SIGNIFICANT CHANGE UP (ref 2–14)
NEUTROPHILS # BLD AUTO: 1.71 K/UL — LOW (ref 1.8–7.4)
NEUTROPHILS NFR BLD AUTO: 40.2 % — LOW (ref 43–77)
NITRITE UR-MCNC: NEGATIVE — SIGNIFICANT CHANGE UP
NRBC # BLD: 0 /100 WBCS — SIGNIFICANT CHANGE UP (ref 0–0)
PH UR: 6 — SIGNIFICANT CHANGE UP (ref 5–8)
PLATELET # BLD AUTO: 206 K/UL — SIGNIFICANT CHANGE UP (ref 150–400)
POTASSIUM SERPL-MCNC: 3.5 MMOL/L — SIGNIFICANT CHANGE UP (ref 3.5–5.3)
POTASSIUM SERPL-SCNC: 3.5 MMOL/L — SIGNIFICANT CHANGE UP (ref 3.5–5.3)
PROT SERPL-MCNC: 7.8 G/DL — SIGNIFICANT CHANGE UP (ref 6–8.3)
PROT UR-MCNC: 15
RBC # BLD: 5.55 M/UL — SIGNIFICANT CHANGE UP (ref 4.2–5.8)
RBC # FLD: 14.7 % — HIGH (ref 10.3–14.5)
RBC CASTS # UR COMP ASSIST: ABNORMAL /HPF (ref 0–2)
SODIUM SERPL-SCNC: 141 MMOL/L — SIGNIFICANT CHANGE UP (ref 135–145)
SP GR SPEC: 1.01 — SIGNIFICANT CHANGE UP (ref 1.01–1.02)
TROPONIN I SERPL-MCNC: <0.015 NG/ML — SIGNIFICANT CHANGE UP (ref 0–0.04)
UROBILINOGEN FLD QL: NEGATIVE — SIGNIFICANT CHANGE UP
WBC # BLD: 4.26 K/UL — SIGNIFICANT CHANGE UP (ref 3.8–10.5)
WBC # FLD AUTO: 4.26 K/UL — SIGNIFICANT CHANGE UP (ref 3.8–10.5)
WBC UR QL: SIGNIFICANT CHANGE UP /HPF (ref 0–5)

## 2020-06-09 PROCEDURE — 74176 CT ABD & PELVIS W/O CONTRAST: CPT | Mod: 26

## 2020-06-09 PROCEDURE — 87086 URINE CULTURE/COLONY COUNT: CPT

## 2020-06-09 PROCEDURE — 83690 ASSAY OF LIPASE: CPT

## 2020-06-09 PROCEDURE — 84484 ASSAY OF TROPONIN QUANT: CPT

## 2020-06-09 PROCEDURE — 81001 URINALYSIS AUTO W/SCOPE: CPT

## 2020-06-09 PROCEDURE — 85027 COMPLETE CBC AUTOMATED: CPT

## 2020-06-09 PROCEDURE — 36415 COLL VENOUS BLD VENIPUNCTURE: CPT

## 2020-06-09 PROCEDURE — 99284 EMERGENCY DEPT VISIT MOD MDM: CPT | Mod: 25

## 2020-06-09 PROCEDURE — 96374 THER/PROPH/DIAG INJ IV PUSH: CPT

## 2020-06-09 PROCEDURE — 71045 X-RAY EXAM CHEST 1 VIEW: CPT

## 2020-06-09 PROCEDURE — 96375 TX/PRO/DX INJ NEW DRUG ADDON: CPT

## 2020-06-09 PROCEDURE — 74176 CT ABD & PELVIS W/O CONTRAST: CPT

## 2020-06-09 PROCEDURE — 71045 X-RAY EXAM CHEST 1 VIEW: CPT | Mod: 26

## 2020-06-09 PROCEDURE — 80053 COMPREHEN METABOLIC PANEL: CPT

## 2020-06-09 PROCEDURE — 93005 ELECTROCARDIOGRAM TRACING: CPT

## 2020-06-09 PROCEDURE — 99285 EMERGENCY DEPT VISIT HI MDM: CPT | Mod: 25

## 2020-06-09 RX ORDER — KETOROLAC TROMETHAMINE 30 MG/ML
15 SYRINGE (ML) INJECTION ONCE
Refills: 0 | Status: DISCONTINUED | OUTPATIENT
Start: 2020-06-09 | End: 2020-06-09

## 2020-06-09 RX ORDER — HYDRALAZINE HCL 50 MG
10 TABLET ORAL ONCE
Refills: 0 | Status: COMPLETED | OUTPATIENT
Start: 2020-06-09 | End: 2020-06-09

## 2020-06-09 RX ORDER — LABETALOL HCL 100 MG
10 TABLET ORAL ONCE
Refills: 0 | Status: COMPLETED | OUTPATIENT
Start: 2020-06-09 | End: 2020-06-09

## 2020-06-09 RX ADMIN — Medication 10 MILLIGRAM(S): at 17:28

## 2020-06-09 RX ADMIN — Medication 15 MILLIGRAM(S): at 17:27

## 2020-06-09 RX ADMIN — Medication 10 MILLIGRAM(S): at 16:08

## 2020-06-09 NOTE — ED PROVIDER NOTE - CARE PROVIDER_API CALL
[place] [address]  Phone: [phone]  Fax: [phone]  Follow Up Time: 7-10 Days

## 2020-06-09 NOTE — ED ADULT TRIAGE NOTE - CHIEF COMPLAINT QUOTE
Sent by dr.sure for HTN urgency and abd pain r/o AAA, pt has left flank pain for a year Sent by dr.sure for HTN urgency and abd pain r/o AAA, pt has left flank pain for a year and hematuria

## 2020-06-09 NOTE — ED PROVIDER NOTE - CARE PLAN
Principal Discharge DX:	Hypertension  Secondary Diagnosis:	Hematuria  Secondary Diagnosis:	Back pain

## 2020-06-09 NOTE — ED PROVIDER NOTE - PATIENT PORTAL LINK FT
You can access the FollowMyHealth Patient Portal offered by [facility] by registering at the following website: [email or web address]. By joining [employer]’s FollowMyHealth portal, you will also be able to view your health information using other applications (apps) compatible with our system.

## 2020-06-09 NOTE — ED ADULT NURSE NOTE - NSIMPLEMENTINTERV_GEN_ALL_ED
Implemented All Universal Safety Interventions:  [place] to call system. Call bell, personal items and telephone within reach. Instruct patient to call for assistance. Room bathroom lighting operational. Non-slip footwear when patient is off stretcher. Physically safe environment: no spills, clutter or unnecessary equipment. Stretcher in lowest position, wheels locked, appropriate side rails in place.

## 2020-06-09 NOTE — ED PROVIDER NOTE - NSFOLLOWUPINSTRUCTIONS_ED_ALL_ED_FT
You were seen today for your blood pressure and your back pain. Your CAT scan did not show kidney stones. You did have blood in your urine. Please follow up with Dr. Vaughan for further evaluation in 1 week. Please return to the Emergency Department for worsening signs or symptoms.

## 2020-06-09 NOTE — ED ADULT NURSE NOTE - CHIEF COMPLAINT QUOTE
Sent by dr.sure for HTN urgency and abd pain r/o AAA, pt has left flank pain for a year and hematuria

## 2020-06-09 NOTE — ED PROVIDER NOTE - OBJECTIVE STATEMENT
51 yo male with PMHx of HTN, presents sent in by Dr. Salgado with left sided back pain and lower abdominal pain. Patient states worsening pain in his left back for the last year so he went to go see Dr. Salgado to and was then sent to ER for evaluation. Patient also with diffuse lower abdominal pain for 1 week  with associated hematuria as per Dr. Salgado. Patient has not taken his blood pressure medications over the last 2 days due to Rx issues. Denies fevers, nausea, emesis, chest pain, shortness of breath, dysuria, diarrhea, constipation, or any other acute complaints. 49 yo male with PMHx of HTN, presents sent in by Dr. Vaughan with left sided back pain and lower abdominal pain. Patient states worsening pain in his left back for the last year. Went to see Dr. Vaughan today and was to ER for evaluation for eval for HTN urgency and hematuria. Patient also with diffuse lower abdominal pain for 1 week. Patient has not taken his blood pressure medications over the last 2 days due to Rx issues. Denies fevers, nausea, emesis, chest pain, shortness of breath, dysuria, diarrhea, constipation, or any other acute complaints.

## 2020-06-09 NOTE — ED PROVIDER NOTE - CLINICAL SUMMARY MEDICAL DECISION MAKING FREE TEXT BOX
51 yo M from PMD office for eval of HTN urgency. patient asymptomatic. With chronic lower back pain X 1 year and diffuse lower abd pain X 1 week. Non focal exam. Noncompliant with bp meds. Dr. Vaughan aware of results and will follow up with patient. Nontoxic and medically stable for discharge. Return precautions provided and patient understands to return to the ED for worsening signs and symptoms. Instructed to follow up with primary care physician/specialist and agreeable. Patient's questions answered and given copies of lab results.

## 2020-06-10 LAB
CULTURE RESULTS: SIGNIFICANT CHANGE UP
SPECIMEN SOURCE: SIGNIFICANT CHANGE UP

## 2020-10-07 ENCOUNTER — EMERGENCY (EMERGENCY)
Facility: HOSPITAL | Age: 50
LOS: 1 days | Discharge: HOME | End: 2020-10-07
Attending: EMERGENCY MEDICINE
Payer: MEDICAID

## 2020-10-07 VITALS
OXYGEN SATURATION: 98 % | HEART RATE: 77 BPM | TEMPERATURE: 98 F | WEIGHT: 199.96 LBS | RESPIRATION RATE: 18 BRPM | DIASTOLIC BLOOD PRESSURE: 76 MMHG | SYSTOLIC BLOOD PRESSURE: 111 MMHG | HEIGHT: 66 IN

## 2020-10-07 PROCEDURE — 99283 EMERGENCY DEPT VISIT LOW MDM: CPT | Mod: 25

## 2020-10-07 PROCEDURE — 29130 APPL FINGER SPLINT STATIC: CPT | Mod: F6

## 2020-10-07 PROCEDURE — 73140 X-RAY EXAM OF FINGER(S): CPT

## 2020-10-07 PROCEDURE — 73140 X-RAY EXAM OF FINGER(S): CPT | Mod: 26,RT

## 2020-10-07 RX ORDER — IBUPROFEN 200 MG
600 TABLET ORAL ONCE
Refills: 0 | Status: COMPLETED | OUTPATIENT
Start: 2020-10-07 | End: 2020-10-07

## 2020-10-07 RX ADMIN — Medication 600 MILLIGRAM(S): at 12:19

## 2020-10-07 RX ADMIN — Medication 600 MILLIGRAM(S): at 13:14

## 2020-10-07 NOTE — ED ADULT NURSE NOTE - NSIMPLEMENTINTERV_GEN_ALL_ED
Implemented All Universal Safety Interventions:  [place] to call system. Call bell, personal items and telephone within reach. Instruct patient to call for assistance. Room bathroom lighting operational. Non-slip footwear when patient is off stretcher. Physically safe environment: no spills, clutter or unnecessary equipment. Stretcher in lowest position, wheels locked, appropriate side rails in place.

## 2020-10-07 NOTE — ED PROVIDER NOTE - CLINICAL SUMMARY MEDICAL DECISION MAKING FREE TEXT BOX
Based on exam and physical possible fracture vs ligamentous injury, will xray and offer analgesia, reassess

## 2020-10-07 NOTE — ED PROVIDER NOTE - OBJECTIVE STATEMENT
51 yo male with PMHx of HTN, presenting to ED with 1 month of right 2nd digit pain/swelling s/p injury. patient denies overt injury, states no pain at first then as the swelling increased pain became more apparent. Patient denies taking any analgesia

## 2020-10-07 NOTE — ED PROVIDER NOTE - HIV OFFER
"lisinopril (ZESTRIL) 40 MG table    Last Written Prescription Date:  6/1/2019  Last Fill Quantity: 90,  # refills: 3   Last office visit: 5/18/2020 with prescribing provider:  Kaycee  Future Office Visit:   Next 5 appointments (look out 90 days)    Jul 22, 2020  1:00 PM CDT  Office Visit with Shola Benoit MD  [facility] ([facility]) [address] [facility] [address] [phone]             Requested Prescriptions   Pending Prescriptions Disp Refills     lisinopril (ZESTRIL) 40 MG tablet [Pharmacy Med Name: LISINOPRIL 40MG TABS] 90 tablet 3     Sig: TAKE ONE TABLET BY MOUTH ONCE DAILY       ACE Inhibitors (Including Combos) Protocol Failed - 7/18/2020 11:02 PM        Failed - Blood pressure under 140/90 in past 12 months     BP Readings from Last 3 Encounters:   05/18/20 (!) 176/78   01/21/20 128/59   01/16/20 (!) 187/73                 Failed - Normal serum creatinine on file in past 12 months     Recent Labs   Lab Test 05/18/20  1423  02/16/18  1147   CR 1.07*   < >  --    CREAT  --   --  1.3*    < > = values in this interval not displayed.       Ok to refill medication if creatinine is low          Passed - Recent (12 mo) or future (30 days) visit within the authorizing provider's specialty     Patient has had an office visit with the authorizing provider or a provider within the authorizing providers department within the previous 12 mos or has a future within next 30 days. See \"Patient Info\" tab in inbasket, or \"Choose Columns\" in Meds & Orders section of the refill encounter.              Passed - Medication is active on med list        Passed - Patient is age 18 or older        Passed - No active pregnancy on record        Passed - Normal serum potassium on file in past 12 months     Recent Labs   Lab Test 05/18/20  1423   POTASSIUM 4.4             Passed - No positive pregnancy test within past 12 months             "
Previously Declined (within the last year)

## 2020-10-07 NOTE — ED PROVIDER NOTE - PATIENT PORTAL LINK FT
You can access the FollowMyHealth Patient Portal offered by [facility] by registering at the following website: [email or web address]. By joining [facility]’s FollowMyHealth portal, you will also be able to view your health information using other applications (apps) compatible with our system.

## 2020-10-07 NOTE — ED ADULT NURSE NOTE - OBJECTIVE STATEMENT
Pt c/o right hand index finger swelling for the last month and pain , pt denies any injury. No acute distress noted. Safety maintained.

## 2020-10-07 NOTE — ED PROCEDURE NOTE - CPROC ED POST PROC CARE GUIDE1
Verbal/written post procedure instructions were given to patient/caregiver./Instructed patient/caregiver regarding signs and symptoms of infection./Instructed patient/caregiver to follow-up with primary care physician./Keep the cast/splint/dressing clean and dry./Elevate the injured extremity as instructed.

## 2020-10-07 NOTE — ED PROVIDER NOTE - PROGRESS NOTE DETAILS
FU with hand. Pt is well appearing walking with steady gait, stable for discharge and follow up without fail with medical doctor. I had a detailed discussion with the patient and/or guardian regarding the historical points, exam findings, and any diagnostic results supporting the discharge diagnosis. Pt educated on care and need for follow up. Strict return instructions and red flag signs and symptoms discussed with patient. Questions answered. Pt shows understanding of discharge information and agrees to follow.

## 2020-10-07 NOTE — ED PROVIDER NOTE - ATTENDING CONTRIBUTION TO CARE
I completed an independent physical examination.   I have signed out the follow up of any pending tests (i.e. labs, radiological studies) to the PA/NP.  I have discussed the patient’s plan of care and disposition with the PA/NP    patient with finger pain. X-ray, splint, follow up with hand.

## 2020-10-23 ENCOUNTER — APPOINTMENT (OUTPATIENT)
Dept: ORTHOPEDIC SURGERY | Facility: CLINIC | Age: 50
End: 2020-10-23

## 2020-11-18 ENCOUNTER — APPOINTMENT (OUTPATIENT)
Dept: ORTHOPEDIC SURGERY | Facility: CLINIC | Age: 50
End: 2020-11-18
Payer: MEDICAID

## 2020-11-18 VITALS
DIASTOLIC BLOOD PRESSURE: 82 MMHG | HEIGHT: 66 IN | WEIGHT: 200 LBS | SYSTOLIC BLOOD PRESSURE: 117 MMHG | BODY MASS INDEX: 32.14 KG/M2 | HEART RATE: 76 BPM

## 2020-11-18 DIAGNOSIS — M65.321 TRIGGER FINGER, RIGHT INDEX FINGER: ICD-10-CM

## 2020-11-18 PROCEDURE — 73130 X-RAY EXAM OF HAND: CPT | Mod: RT

## 2020-11-18 PROCEDURE — 20550 NJX 1 TENDON SHEATH/LIGAMENT: CPT | Mod: F6

## 2020-11-18 PROCEDURE — 99204 OFFICE O/P NEW MOD 45 MIN: CPT | Mod: 25

## 2020-11-18 RX ORDER — DICLOFENAC SODIUM 50 MG/1
50 TABLET, DELAYED RELEASE ORAL TWICE DAILY
Qty: 60 | Refills: 1 | Status: ACTIVE | COMMUNITY
Start: 2020-11-18 | End: 1900-01-01

## 2020-11-18 NOTE — HISTORY OF PRESENT ILLNESS
[de-identified] : CC R Hand\par \par HPI 49 yo male right HD presents with gradual onset of 2 months of activity-related pain in the index finger right hand [without injury]. The pain is worse, and rated a 6 out of 10, described as sharp, [without radiation]. Rest makes the pain better and finger flexion makes the pain worse. The patient reports associated symptoms of loss of motion. The patient + pain at night affecting sleep, - neck pain, and - similar pain previously.\par \par The patient has tried the following treatments:\par Activity modification	+\par Ice			+\par Brace			-\par Nsaids    		+\par Physical Therapy  	-\par Cortisone Injection	-\par Arthroscopy/Surgery	-\par \par Review of Systems is positive for the above musculoskeletal symptoms and is otherwise non-contributory for general, constitutional, psychiatric, neurologic, HEENT, cardiac, respiratory, gastrointestinal, reproductive, lymphatic, and dermatologic complaints.\par \par Consult by

## 2020-11-18 NOTE — DISCUSSION/SUMMARY
[de-identified] : Right hand index finger trigger finger versus tenosynovitis of the flexor tendons\par No evidence of infectious tenosynovitis\par \par I discussed the findings on history exam and radiology\par \par I discussed the cause and treatment of trigger finger using diagrams and models to discuss the swelling of the tendon passing through a narrow tunnel causing inflammation pain and locking of the finger. Discussed operative versus nonoperative management including, nonsteroidal inflammatories, activity modification, Occupational therapy, bracing, corticosteroid injection, operative A1 pulley release. Benefits and risks of the different treatment modalities were discussed.\par \par \par Procedure Note:\par \par Risks and benefits of a tendon sheath corticosteroid injection of the right hand index finger were discussed with the patient. Potential adverse effects were discussed including but not limited to bleeding, skin/ joint infection, local skin reactions including bleaching, bruising, stiffness, soreness, vasovagal episodes, transient hyperglycemia, avascular necrosis, pseudo-septic type reactions, post injection joint pain, allergic reaction to product or anesthetic and other rare but potential adverse effects along with benefits including decreased pain and improved stability prior to obtaining verbal informed consent. It was also discussed that for some patients the treatment is ineffective and there are no guarantees that the patient will experience improvement as the result of the injection. In rare occasions the injection can cause worsening of pain.\par \par After verbal consent, the flexor tendon nodule on the volar skin proximal to the mcp joint was palpated and identified after range of motion of the index finger MCP joint. The injection site was marked and prepped with a ChloraPrep swab and anesthetized with ethylchloride skin anesthesia. Under sterile technique a 25g 1/2 in needle with 1.5 cc total of 0.5cc 1% lidocaine without epinephrine, 0.5cc 0.25% Marcaine without epinephrine and 0.5cc of Kenalog 40mg/cc was passed through the injection site towards the tendon sheath. The medication was injected without resistance. The injection site was sterilely dressed, there was minimal blood loss. The patient tolerated this procedure without any complications done by myself.\par \par The patient has been advised that if they notice any worsening of symptoms or any problems to contact me and seek care from a qualified medical professional. The patient was instructed to ice the thumb and take NSAID medication on an as needed basis if the patient feels discomfort.\par \par The patient was prescribed Diclofenac PO non-steroidal anti-inflammatory medication. 50mg tablets twice daily to be taken for at least 1-2 weeks in a row and then PRN afterwards. Risks and benefits were discussed and include but not limited to renal damage and GI ulceration and bleeding.  They were advised to take with food to limit stomach upset as well as warned to stop the medication if worsening gastric pain or dizziness or other side effects. Also to immediately stop the medication and seek appropriate medical attention if any severe stomach ache, gastritis, black/red vomit, black/red stools or any other medical concern.\par \par The patient was prescribed Diclofenac PO non-steroidal anti-inflammatory medication. 50mg tablets twice daily to be taken for at least 1-2 weeks in a row and then PRN afterwards. Risks and benefits were discussed and include but not limited to renal damage and GI ulceration and bleeding.  They were advised to take with food to limit stomach upset as well as warned to stop the medication if worsening gastric pain or dizziness or other side effects. Also to immediately stop the medication and seek appropriate medical attention if any severe stomach ache, gastritis, black/red vomit, black/red stools or any other medical concern.\par \par \par \par Physical therapy\par \par Followup 6 weeks possible hand specialist

## 2020-11-18 NOTE — PHYSICAL EXAM
[de-identified] : Physical Examination\par General: well nourished, in no acute distress, alert and oriented to person, place and time\par Psychiatric: normal mood and affect, no abnormal movements or speech patterns\par Eyes: vision intact - glasses\par Throat: no thyromegaly\par Lymph: no enlarged nodes, no lymphedema in extremity\par Respiratory: no wheezing, no shortness of breath with ambulation\par Cardiac: no cardiac leg swelling, 2+ peripheral pulses\par Neurology: normal gross sensation in extremities to light touch\par Abdomen: soft, non-tender, tympanic, no masses\par \par Musculoskeletal Examination\par Cervical spine		Full painless range of motion and negative Spurling's test\par \par Hand			Right			Left\par Appearance\par      Skin			normal			normal\par      Swelling/Deformity	normal			normal\par  Range of Motion\par      Wrist Flexion		75			75\par      Wrist Extension	60			60\par      Finger Flexion  	0 cm palmar crease passively, 3cm to crease actively	0 cm palmar crease\par      Finger Extension	Full			Full\par      Supination		85			85\par      Pronation		90			90\par \par Palpation\par      Snuff box		-			-\par      ScaphoLunate 	-			-\par      ECU/Ulna Styloid	-			-\par      Cubital Tunnel 	-			-\par      OTHER		IF A1 pulley			-\par Strength Examination\par      Wrist Flexion		5+ / 0			5+ / 0\par      Wrist Extension	5+ / 0			5+ / 0\par      Finger Flexion  	5+ / 0			5+ / 0\par      Finger Extension	5+ / 0			5+ / 0\par      			pain			-\par      Pincer		-			-\par Special Examination\par      Finklesteins		-			-\par      Carpal Tinnel		-			-\par      Carpal Compression	-			-\par      Phalens		-			-\par      Ulnar Canal Tinnel	-			-\par      Cubital Tunnel Tinnel	-			-\par      See's		-			-\par Sensation\par      Axillary		normal			normal\par      LatAntCubBrach 	normal			normal\par      Median 		normal			normal\par      Ulnar 		normal			normal\par      Radial 		normal			normal\par Motor\par      AIN 			normal			normal\par      Ulnar 		normal			normal\par      Radial 		normal			normal\par      PIN 			normal			normal\par Pulses\par      Radial	 	2+			2+\par  [de-identified] : 3 views of the right hand\par Were Ordered, Taken and Evaluated by Myself Today and\par Demonstrate:\par \par Normal findings

## 2023-01-03 NOTE — ED ADULT NURSE NOTE - ISOLATION TYPE:
Reviewed discharge instructions, pt verbalized understanding of instructions and medication. States he will schedule follow-up appointment. Denies further questions at this time. Pt ambulatory out of ER with steady gait.      Airborne+Contact precautions
